# Patient Record
Sex: FEMALE | NOT HISPANIC OR LATINO | Employment: UNEMPLOYED | URBAN - METROPOLITAN AREA
[De-identification: names, ages, dates, MRNs, and addresses within clinical notes are randomized per-mention and may not be internally consistent; named-entity substitution may affect disease eponyms.]

---

## 2020-07-31 ENCOUNTER — TRANSCRIBE ORDERS (OUTPATIENT)
Dept: ADMINISTRATIVE | Facility: HOSPITAL | Age: 35
End: 2020-07-31

## 2020-07-31 ENCOUNTER — TELEPHONE (OUTPATIENT)
Dept: GENETICS | Facility: HOSPITAL | Age: 35
End: 2020-07-31

## 2020-07-31 DIAGNOSIS — Z80.3 FAMILY HISTORY OF BREAST CANCER IN SISTER: Primary | ICD-10-CM

## 2020-08-01 ENCOUNTER — HOSPITAL ENCOUNTER (OUTPATIENT)
Dept: MAMMOGRAPHY | Facility: HOSPITAL | Age: 35
Discharge: HOME | End: 2020-08-01
Admitting: OBSTETRICS & GYNECOLOGY

## 2020-08-01 DIAGNOSIS — Z80.3 FAMILY HISTORY OF BREAST CANCER IN SISTER: ICD-10-CM

## 2020-08-01 PROCEDURE — 77063 BREAST TOMOSYNTHESIS BI: CPT

## 2020-08-01 PROCEDURE — 77063 BREAST TOMOSYNTHESIS BI: CPT | Performed by: RADIOLOGY

## 2020-08-01 PROCEDURE — 77067 SCR MAMMO BI INCL CAD: CPT | Performed by: RADIOLOGY

## 2020-08-01 PROCEDURE — 77067 SCR MAMMO BI INCL CAD: CPT

## 2020-08-11 ENCOUNTER — CLINICAL SUPPORT (OUTPATIENT)
Dept: GENETICS | Facility: HOSPITAL | Age: 35
End: 2020-08-11

## 2020-08-11 DIAGNOSIS — Z80.3 FAMILY HISTORY OF BREAST CANCER: Primary | ICD-10-CM

## 2020-08-11 NOTE — PROGRESS NOTES
Rebecca Bromberg is a 35-year-old female who was referred for genetic counseling due to a family history of breast cancer.  Ms. Bromberg is premenopausal and retains her uterus and one ovary.  Ms. Bromberg was 11 years old at menarche and is nulliparous. She recently had her first mammogram, and is currently scheduled for a follow-up diagnostic mammogram.  She reports no breast biopsies in the past.  Ms. Bromberg was interested in discussing her breast cancer risk as well recommended screening.      PERTINENT FAMILY HISTORY: (see attached pedigree)  Sister: Breast cancer, 39 (Genetic testing through [employer] gene panel negative for known pathogenic mutations in BRCA1/2 or any other high or moderate risk cancer susceptibility gene)  Mat. Great-aunt: Breast cancer, early 70s  Pat Grandmother: Breast cancer, 80  Pat. Grandfather: Melanoma    We were able to review a copy of Ms. Bromberg’s sister’s genetic test results.    RISK ASSESSMENT:  Ms. Bromberg’s family history of early onset breast cancer in her sister raised the question of a hereditary cancer syndrome.  However, Ms. Bromberg’s sister had comprehensive multigene panel genetic testing performed which was negative for known pathogenic mutations in BRCA1/2 as well as 82 additional genes associated with hereditary cancer risk.  A variant of uncertain significance (VUS) was reported in the PHOX2B gene on her sister’s result.  VUSs are frequently reported through multigene panel testing, given the number of genes analyzed and the presence of genetic variation in the population.  VUSs are not clinically actionable findings, and it is not recommended to test unaffected relatives for a VUS.   Since Ms. Bromberg’s sister had genetic testing that was negative for a pathogenic/causative mutation, and the remainder of the family history would not warrant genetic testing per NCCN guidelines, at this time genetic testing is not recommended for Ms. Bromberg or other  unaffected individuals. Ms. Bromberg’s risk should be determined by her family history risk assessment.      Based on computer modeling, Ms. Bromberg has a lifetime risk for breast cancer of up to 26.4% (Montrell-Robert/NATALEE) which is elevated over the general population risk for breast cancer of 12.5%. A risk greater than 20% warrants consideration of increased screening.  These risk assessments are based on the family history information provided at the time of the appointment.  The assessments could change in the future should new information be obtained.    GENETIC COUNSELING: We reviewed the family history information in detail.  Cancer is very common; approximately 1 in 3 individuals will develop cancer within a lifetime.  It is not uncommon to see multiple individuals within a family with cancer given the high chance for a person to develop cancer.  The interaction of many factors determines each person’s personal risk, including age, family or personal history of cancer, and external factors such as diet and environmental exposures.  Exactly how these various risk factors interact in an individual is unclear.  Most often, we believe cancer to be a multifactorial disease caused by an accumulation of genetic alterations acquired over our lifetime, with environmental factors acting in the development of a malignancy.    Breast cancer cases follow three general patterns: sporadic, familial, and hereditary.  While most breast cancer (75-80%) is sporadic, some cases appear to occur in family clusters.  These cases are said to be familial and account for 10-20% of breast cancer cases.  Familial cases may be due to a combination of shared genes and environmental factors among family members.  In even fewer cases, the risk for cancer is inherited, and the genes responsible for the increased cancer risk are known.      Family histories typical of hereditary cancer syndromes usually include multiple first- and  second-degree relatives diagnosed with cancer types that define a syndrome.  These cases tend to be diagnosed at younger-than-expected ages and can be bilateral or multifocal.  The cancer in these families follows an autosomal dominant inheritance pattern, which indicates the likely presence of a mutation in a cancer susceptibility gene.  Children and siblings of an individual known to carry a mutation have a 50% chance of inheriting that mutation, thereby inheriting the increased risk to develop cancer.  These mutations can be passed down from the maternal or the paternal lineage.    Hereditary breast cancer accounts for 5-10% of all cases of breast cancer.  A significant proportion of hereditary breast cancer can be attributed to mutations in the BRCA1 and BRCA2 genes.  Mutations in these genes confer an increased risk for breast cancer, ovarian cancer, male breast cancer, prostate cancer, and pancreatic cancer.  Multigene panels are frequently utilized, which allow for the evaluation of the BRCA genes and a number of other cancer susceptibility genes simultaneously.  When testing via a panel does not identify a pathogenic mutation in an affected relative, testing is typically not indicated for unaffected relatives.    GENETIC TESTING:   The importance of initiating testing on an affected family member was emphasized.  In general, a negative genetic test result is only informative if a mutation has first been established in an affected member of the family.  Since Ms. Bromberg’s sister, who was diagnosed breast cancer at age 39, already had comprehensive multigene panel testing which did not identify any known pathogenic mutations, testing is not recommended for Ms. Bromberg or other unaffected relatives.  It is recommended that Ms. Bromberg consider increased surveillance for her breast cancer risk based on family history-based risk assessment.  This assessment could change should there be changes to Ms.  Bromberg’s personal or family history.     CLINICAL MANAGEMENT GUIDELINES: Options available to individuals with a high lifetime risk for breast were discussed, including increased surveillance and chemoprevention.  Given her family history, Ms. Bromberg is at an increased lifetime risk for breast cancer and increased surveillance is likely warranted.      Increased surveillance, based on NCCN guidelines, would consist of semi-annual clinical breast exam and annual mammography starting 10 years prior to the earliest diagnosis in the family.  According to an American Cancer Society expert panel and NCCN guidelines, annual breast MRI should be offered to women whose lifetime risk of breast cancer is 20-25 percent or more (Ms. Bromberg’s risk was estimated to be up to 26.4%), also starting 10 years before the earliest diagnosis in the family.  Breast cancer chemoprevention is another option that Ms. Bromberg may wish consider in the future.  Studies have shown that Tamoxifen and Raloxifene can cut the risk of estrogen receptor positive breast cancer by 50% when taken by high-risk women over a 5-year period.  There are risks and side effects associated with these medications; therefore the risks versus benefits must be considered prior to deciding to take chemopreventative medications.    PLAN:   Genetic counseling remains available for Ms. Bromberg. She is a candidate to be followed in the Cancer Risk Management Clinic, which takes place in the GYN Oncology office at [facility].  The purpose of this clinic is for coordination of increased screening and further discussion of chemoprevention.  She can be referred to this clinic at any point, and Ms. Bromberg is welcome to contact me if she would like to be referred.  She is welcome to contact us with any questions or concerns, or updates to the family history at [phone].          Basia Pena MS. [facility], [facility]  Licensed Certified Genetic  Counselor      cc:  Rebecca M. Bromberg Allison Cook, MD

## 2020-08-28 ENCOUNTER — APPOINTMENT (OUTPATIENT)
Dept: MAMMOGRAPHY | Facility: HOSPITAL | Age: 35
End: 2020-08-28

## 2020-09-02 ENCOUNTER — HOSPITAL ENCOUNTER (OUTPATIENT)
Dept: MAMMOGRAPHY | Facility: HOSPITAL | Age: 35
Discharge: HOME | End: 2020-09-02
Admitting: RADIOLOGY

## 2020-09-02 ENCOUNTER — TRANSCRIBE ORDERS (OUTPATIENT)
Dept: MAMMOGRAPHY | Facility: HOSPITAL | Age: 35
End: 2020-09-02

## 2020-09-02 DIAGNOSIS — R92.8 ABNORMAL MAMMOGRAM: Primary | ICD-10-CM

## 2020-09-02 DIAGNOSIS — R92.8 ABNORMAL MAMMOGRAM: ICD-10-CM

## 2020-09-02 PROCEDURE — 77065 DX MAMMO INCL CAD UNI: CPT | Performed by: RADIOLOGY

## 2020-09-02 PROCEDURE — 77065 DX MAMMO INCL CAD UNI: CPT

## 2020-09-11 ENCOUNTER — HOSPITAL ENCOUNTER (OUTPATIENT)
Dept: MAMMOGRAPHY | Facility: HOSPITAL | Age: 35
Discharge: HOME | End: 2020-09-11

## 2020-09-11 DIAGNOSIS — R92.8 ABNORMAL MAMMOGRAM: ICD-10-CM

## 2020-09-11 PROCEDURE — 19081 BX BREAST 1ST LESION STRTCTC: CPT | Performed by: RADIOLOGY

## 2020-09-11 PROCEDURE — 88305 TISSUE EXAM BY PATHOLOGIST: CPT | Performed by: OBSTETRICS & GYNECOLOGY

## 2020-09-11 PROCEDURE — A4648 IMPLANTABLE TISSUE MARKER: HCPCS

## 2020-09-11 PROCEDURE — 76098 X-RAY EXAM SURGICAL SPECIMEN: CPT

## 2020-09-11 PROCEDURE — 25010000003 LIDOCAINE 1 % SOLUTION: Performed by: RADIOLOGY

## 2020-09-11 PROCEDURE — 77065 DX MAMMO INCL CAD UNI: CPT | Performed by: RADIOLOGY

## 2020-09-11 RX ORDER — LIDOCAINE HYDROCHLORIDE 10 MG/ML
5 INJECTION, SOLUTION INFILTRATION; PERINEURAL ONCE
Status: COMPLETED | OUTPATIENT
Start: 2020-09-11 | End: 2020-09-11

## 2020-09-11 RX ORDER — LIDOCAINE HYDROCHLORIDE AND EPINEPHRINE 10; 10 MG/ML; UG/ML
10 INJECTION, SOLUTION INFILTRATION; PERINEURAL ONCE
Status: COMPLETED | OUTPATIENT
Start: 2020-09-11 | End: 2020-09-11

## 2020-09-11 RX ADMIN — LIDOCAINE HYDROCHLORIDE 1 ML: 10 INJECTION, SOLUTION INFILTRATION; PERINEURAL at 11:28

## 2020-09-11 RX ADMIN — LIDOCAINE HYDROCHLORIDE,EPINEPHRINE BITARTRATE 3 ML: 10; .01 INJECTION, SOLUTION INFILTRATION; PERINEURAL at 11:33

## 2020-09-14 LAB
CYTO UR: NORMAL
LAB AP CASE REPORT: NORMAL
LAB AP CLINICAL INFORMATION: NORMAL
LAB AP DIAGNOSIS COMMENT: NORMAL
PATH REPORT.FINAL DX SPEC: NORMAL
PATH REPORT.GROSS SPEC: NORMAL

## 2020-09-16 ENCOUNTER — TELEPHONE (OUTPATIENT)
Dept: MAMMOGRAPHY | Facility: HOSPITAL | Age: 35
End: 2020-09-16

## 2020-12-17 ENCOUNTER — LAB (OUTPATIENT)
Dept: LAB | Facility: HOSPITAL | Age: 35
End: 2020-12-17

## 2020-12-17 ENCOUNTER — TRANSCRIBE ORDERS (OUTPATIENT)
Dept: LAB | Facility: HOSPITAL | Age: 35
End: 2020-12-17

## 2020-12-17 DIAGNOSIS — R30.0 DYSURIA: Primary | ICD-10-CM

## 2020-12-17 DIAGNOSIS — R30.0 DYSURIA: ICD-10-CM

## 2020-12-17 PROCEDURE — 87086 URINE CULTURE/COLONY COUNT: CPT

## 2020-12-18 LAB — BACTERIA SPEC AEROBE CULT: NORMAL

## 2023-10-16 NOTE — TELEPHONE ENCOUNTER
Pt notified of pathology results and recommendation. Verbalizes understanding. Denies discomfort. Denies signs and symptoms of infection. Questions answered, verbalized understanding.   Use Enhanced Medication Counseling?: No